# Patient Record
(demographics unavailable — no encounter records)

---

## 2018-04-18 NOTE — PDOC
History of Present Illness





- General


Chief Complaint: Cold Symptoms


Stated Complaint: BODYACHES


Time Seen by Provider: 04/18/18 12:50


History Source: Patient





- History of Present Illness


Timing/Duration: reports: other


Associated Symptoms: reports: cough.  denies: fever/chills, shortness of breath





Past History





- Past Medical History


Allergies/Adverse Reactions: 


 Allergies











Allergy/AdvReac Type Severity Reaction Status Date / Time


 


No Known Allergies Allergy   Verified 04/18/18 12:28











Home Medications: 


Ambulatory Orders





Hydrochlorothiazide [Hctz -] 12.5 mg PO DAILY 01/17/14 


Lisinopril [Prinivil -] 40 mg PO DAILY 01/17/14 








COPD: No


HTN: Yes





- Immunization History


Immunization Up to Date: Yes





- Suicide/Smoking/Psychosocial Hx


Smoking Status: No


Smoking History: Never smoked


Have you smoked in the past 12 months: No


Number of Cigarettes Smoked Daily: 0


Information on smoking cessation initiated: No


Hx Alcohol Use: No


Drug/Substance Use Hx: No


Substance Use Type: None





**Review of Systems





- Review of Systems


Constitutional: No: Chills, Fever


Respiratory: Yes: Cough.  No: Shortness of Breath


Cardiac (ROS): No: Chest Pain





*Physical Exam





- Vital Signs


 Last Vital Signs











Temp Pulse Resp BP Pulse Ox


 


 97.8 F   69   16   141/87   100 


 


 04/18/18 12:25  04/18/18 12:25  04/18/18 12:25  04/18/18 12:25  04/18/18 12:25














- Physical Exam


General Appearance: Yes: Appropriately Dressed.  No: Apparent Distress


HEENT: positive: Normal Voice


Respiratory/Chest: positive: Lungs Clear, Normal Breath Sounds.  negative: 

Respiratory Distress


Cardiovascular: positive: Regular Rate, S1, S2


Integumentary: positive: Dry, Warm


Neurologic: positive: Fully Oriented, Alert, Normal Mood/Affect





Medical Decision Making





- Medical Decision Making





04/18/18 13:24


62 yo M, h/o HTN, here w/ productive cough x 6 days. No sob, f/c. Denies body 

aches to me as was reported to triage. Not taking anything for cough. No tob 

hx. Pt well deni and stable w/ clear chest/lungs. M/l viral. Dc w/ OTC meds and 

pmd f/u as needed





*DC/Admit/Observation/Transfer


Diagnosis at time of Disposition: 


 Cough








- Discharge Dispostion


Disposition: HOME


Condition at time of disposition: Good





- Referrals


Referrals: 


Ana Edwards MD [Primary Care Provider] - 





- Patient Instructions


Additional Instructions: 


Es muy probable que tenga max infeccin viral de las vas respiratorias 

superiores que se resolver a tiempo. Puede carmen Delsym, Robitussin o 

guafenisin en el mostrador. Tambin payal muchos lquidos. Si los sntomas 

persisten, doris un seguimiento con francis PMD





- Post Discharge Activity

## 2018-12-20 NOTE — PDOC
History of Present Illness





- General


Chief Complaint: Cold Symptoms


Stated Complaint: COUGH


Time Seen by Provider: 12/20/18 11:06


History Source: Patient


Exam Limitations: No Limitations





- History of Present Illness


Initial Comments: 





CHIEF COMPLAINT:





HISTORY OF PRESENT ILLNESS:





Vital signs on arrival are within normal limits. 





REVIEW OF SYSTEMS:


GENERAL/CONSTITUTIONAL: Subjective fever/chills. No weakness. No weight change.


HEAD, EYES, EARS, NOSE AND THROAT: No change in vision. No ear pain or 

discharge. No sore throat.


CARDIOVASCULAR: No chest pain or shortness of breath.


RESPIRATORY: No cough, wheezing, or hemoptysis.


GASTROINTESTINAL: See history of present illness.


GENITOURINARY: No dysuria, frequency, or change in urination.


MUSCULOSKELETAL: No joint or muscle swelling or pain. No neck or back pain.


SKIN: No rash or easy bruising.


NEUROLOGIC: No headache, vertigo, loss of consciousness, or loss of sensation.








PHYSICAL EXAM:


GENERAL: The patient is awake, alert, and fully oriented, in no acute distress.


HEAD: Normal with no signs of trauma.


ENT: Pupils equal, round and reactive to light, extraocular movements intact, 

sclera anicteric, conjunctiva clear. Neck supple.


LUNGS: Clear to auscultation bilaterally. Normal excursion. No respiratory 

distress or use of accessory muscles.


CV: RRR, S1/S2, no MRG. Cap refill < 2 sec.


ABDOMEN: Soft, non-distended, non-tender even to deep palpation, no 

hepatomegaly or splenomegaly, no masses.


EXTREMITIES: Normal range of motion, no edema.


NEUROLOGICAL: Normal speech, normal gait. CN II-XII grossly intact.


SKIN: Warm, dry, normal turgor, no rashes or lesions noted.








Past History





- Past Medical History


Allergies/Adverse Reactions: 


 Allergies











Allergy/AdvReac Type Severity Reaction Status Date / Time


 


No Known Allergies Allergy   Verified 04/18/18 12:28











Home Medications: 


Ambulatory Orders





Hydrochlorothiazide [Hctz -] 12.5 mg PO DAILY 01/17/14 


Lisinopril [Prinivil -] 40 mg PO DAILY 01/17/14 


Guaifenesin 200 mg PO Q6H #20 tablet 12/20/18 








COPD: No


HTN: Yes





- Immunization History


Immunization Up to Date: Yes





- Suicide/Smoking/Psychosocial Hx


Smoking Status: No


Smoking History: Never smoked


Have you smoked in the past 12 months: No


Number of Cigarettes Smoked Daily: 0


Hx Alcohol Use: No


Drug/Substance Use Hx: No


Substance Use Type: None





*Physical Exam





- Vital Signs


 Last Vital Signs











Temp Pulse Resp BP Pulse Ox


 


 98.2 F   85   18   141/81   98 


 


 12/20/18 10:16  12/20/18 10:16  12/20/18 10:16  12/20/18 10:16  12/20/18 10:16














Moderate Sedation





- Procedure Monitoring


Vital Signs: 


Procedure Monitoring Vital Signs











Temperature  98.2 F   12/20/18 10:16


 


Pulse Rate  85   12/20/18 10:16


 


Respiratory Rate  18   12/20/18 10:16


 


Blood Pressure  141/81   12/20/18 10:16


 


O2 Sat by Pulse Oximetry (%)  98   12/20/18 10:16











Medical Decision Making





- Medical Decision Making





A/P:














*DC/Admit/Observation/Transfer


Diagnosis at time of Disposition: 


 Cough








- Discharge Dispostion


Disposition: HOME


Condition at time of disposition: Good





- Referrals


Referrals: 


Ana Edwards MD [Primary Care Provider] - 





- Patient Instructions


Printed Discharge Instructions:  DI for Cough -- Adult


Additional Instructions: 


Discharge Instructions:


-A prescription for cough medicine has been sent to your pharmacy


-Please call your doctor in 1 week if no improvement to set up an appointment








Instrucciones de descarga:


-Ade receta para medicamentos contra la tos ha sido enviada a francis farmacia.


- Llame a francis mdico en 1 semana si no hay mejoras para programar ade hawa.





Print Language: Pashto





- Post Discharge Activity


Forms/Work/School Notes:  Back to Work

## 2019-02-25 NOTE — PDOC
History of Present Illness





- General


Chief Complaint: Eye Problem


Stated Complaint: RT EYE PAIN


Time Seen by Provider: 02/25/19 09:32


History Source: Patient


Exam Limitations: No Limitations





- History of Present Illness


Initial Comments: 





02/25/19 


States yesterday was rubbing eyes when it accidentally scratched his right 

cornea. States had immediate pain, irrigated it, use cool compresses but today 

woke up and it was more painful and red. Denies visual changes, no other injury.


Timing/Duration: unsure, 24 hours


Severity: mild, moderate





Past History





- Travel


Traveled outside of the country in the last 30 days: No


Close contact w/someone who was outside of country & ill: No





- Past Medical History


Allergies/Adverse Reactions: 


 Allergies











Allergy/AdvReac Type Severity Reaction Status Date / Time


 


No Known Allergies Allergy   Verified 04/18/18 12:28











Home Medications: 


Ambulatory Orders





Hydrochlorothiazide [Hctz -] 12.5 mg PO DAILY 01/17/14 


Lisinopril [Prinivil -] 40 mg PO DAILY 01/17/14 


Guaifenesin 200 mg PO Q6H #20 tablet 12/20/18 








COPD: No


HTN: Yes





- Immunization History


Immunization Up to Date: Yes





- Suicide/Smoking/Psychosocial Hx


Smoking Status: No


Smoking History: Never smoked


Have you smoked in the past 12 months: No


Number of Cigarettes Smoked Daily: 0


Information on smoking cessation initiated: No


Hx Alcohol Use: No


Drug/Substance Use Hx: No


Substance Use Type: None





**Review of Systems





- Review of Systems


Able to Perform ROS?: Yes


Is the patient limited English proficient: Yes


Constitutional: Yes: Symptoms Reported, See HPI, Malaise.  No: Fever


HEENTM: Yes: Symptoms Reported, See HPI, Eye Pain, Tearing, Other


Musculoskeletal: No: Symptoms Reported


Integumentary: No: Symptoms Reported


All Other Systems: Reviewed and Negative





*Physical Exam





- Vital Signs


 Last Vital Signs











Temp Pulse Resp BP Pulse Ox


 


 97.8 F   76   16   149/83   100 


 


 02/25/19 09:12  02/25/19 09:12  02/25/19 09:12  02/25/19 09:12  02/25/19 09:12














- Physical Exam


General Appearance: Yes: Nourished, Appropriately Dressed, Apparent Distress, 

Mild Distress


HEENT: positive: ANMOL (visual acuity 20/30 and affected eye, 20/50 in 

unaffected eye), TMs Normal, Rhinorrhea, Other (right eye is erythematous with 

tearing, photophobic. Globe is intact, no hyphema fluorescein staining reveals 

a 2 mm superficial abrasion at 9:00 looking at pupil)


Neck: positive: Supple.  negative: Tender


Respiratory/Chest: positive: Lungs Clear, Normal Breath Sounds


Integumentary: positive: Normal Color, Dry, Warm


Neurologic: positive: CNs II-XII NML intact, Fully Oriented, Alert, Normal Mood/

Affect, Normal Response, Motor Strength 5/5





Moderate Sedation





- Procedure Monitoring


Vital Signs: 


Procedure Monitoring Vital Signs











Temperature  97.8 F   02/25/19 09:12


 


Pulse Rate  76   02/25/19 09:12


 


Respiratory Rate  16   02/25/19 09:12


 


Blood Pressure  149/83   02/25/19 09:12


 


O2 Sat by Pulse Oximetry (%)  100   02/25/19 09:12











Medical Decision Making





- Medical Decision Making





02/25/19 corneal abrasion, discussed case with Dr. Bishop's office who will be 

able to see patient at noon today for thorough ophthalmology exam. Erythromycin 

ointment applied and will discharged to be seen by ophthalmology today





*DC/Admit/Observation/Transfer


Diagnosis at time of Disposition: 


Corneal abrasion


Qualifiers:


 Encounter type: initial encounter Laterality: right Qualified Code(s): 

S05.01XA - Injury of conjunctiva and corneal abrasion without foreign body, 

right eye, initial encounter








- Discharge Dispostion


Disposition: HOME


Condition at time of disposition: Stable


Decision to Admit order: No





- Referrals


Referrals: 


Ana Edwards MD [Primary Care Provider] - 


Taty Bishop MD [Staff Physician] - 





- Patient Instructions


Printed Discharge Instructions:  DI for Corneal Abrasion


Additional Instructions: 


Rest, avoid rubbing eyes


Wash hands, use eye drops as directed, wash hands after use 


May use eye lubricating drops as often as needed


erythromycin ointment, one thin film 3 times a day for 5 days


Tylenol or ibuprofen for pain relief


Avoid contact with others until redness and discharge is gone from eyes.


Followup with ophthalmology in one to 2 days for thorough exam


Return to emergency department for worsened pain, swelling, vision problems.





Go TO 'S OFFICE AT 12:00





- Post Discharge Activity


Forms/Work/School Notes:  Back to Work

## 2019-04-08 NOTE — PDOC
History of Present Illness





- General


Chief Complaint: Pain, Acute


Stated Complaint: ABDOMINAL PAIN


Time Seen by Provider: 04/08/19 10:04


History Source: Patient


Exam Limitations: Other (poor historian/insight, even with )





- History of Present Illness


Initial Comments: 


Pt is a 65 yo M, with PMH of nephrolithiasis and HTN (well-controlled), who is 

presenting with complaints of suprapubic pain x4 days. Pt also endorses urinary 

frequency and a "pulling sensation" and worsening lower abdominal pain when he 

urinates. Pt recently went to the [place] 3/1-3/22, and was having 

normal BMs and was drinking bottled water. Pt states he has a history of kidney 

stones, but this pain feels differently and he has not had a stone for "many 

years". Pt has not taken any OTC medication for pain. Pt is tolerating PO food 

and fluid intake without difficulty. Pt has been having regular BMs almost 

every day, but has been having loose stool 2/2 to taking laxatives because "he 

thought maybe he had eaten something bad which is why he had pain". Pt denies 

any recent fevers/chills, headache, vision changes, syncope, chest pain, 

palpitations, SOB, nausea/vomiting, hematuria, testicular pain, penile discharge

, constipation, or leg swelling.





Social: Pt denies any cigarette, alcohol, or drug use. Prior smoker but quit 20 

years ago.


Recent trip to  (see above), but no known sick contacts.


Surgical: no relevant history.


Family: no relevant history.


04/08/19 12:35








Past History





- Travel


Traveled outside of the country in the last 30 days: No


Close contact w/someone who was outside of country & ill: No





- Past Medical History


Allergies/Adverse Reactions: 


 Allergies











Allergy/AdvReac Type Severity Reaction Status Date / Time


 


No Known Allergies Allergy   Verified 04/08/19 08:59











Home Medications: 


Ambulatory Orders





Hydrochlorothiazide [Hctz -] 12.5 mg PO DAILY 01/17/14 


Lisinopril [Prinivil -] 40 mg PO DAILY 01/17/14 


Metronidazole 500 mg PO TID #21 tablet 04/08/19 


Sulfamethoxazole/Trimethoprim [Bactrim Ds -] 1 tab PO BID #14 tablet 04/08/19 








COPD: No


HTN: Yes





- Immunization History


Immunization Up to Date: Yes





- Suicide/Smoking/Psychosocial Hx


Smoking Status: No


Smoking History: Never smoked


Have you smoked in the past 12 months: No


Number of Cigarettes Smoked Daily: 0


Hx Alcohol Use: No


Drug/Substance Use Hx: No


Substance Use Type: None





**Review of Systems





- Review of Systems


Able to Perform ROS?: Yes


Is the patient limited English proficient: No


Constitutional: Yes: Weight Stable.  No: Chills, Diaphoresis, Fever, Loss of 

Appetite, Malaise, Weakness


HEENTM: No: Blurred Vision, Recent change in vision, Nose Congestion, Throat 

Pain, Throat Swelling, Difficulty Swallowing


Respiratory: No: Cough, Orthopnea, Shortness of Breath


Cardiac (ROS): No: Chest Pain, Edema, Irregular Heart Rate, Lightheadedness, 

Palpitations, Syncope, Chest Tightness


ABD/GI: Yes: See HPI, Diarrhea (loose stools 2/2 laxatives, see HPI).  No: 

Abdominal Distended, Blood Streaked Bowels, Constipated, Nausea, Poor Appetite, 

Poor Fluid Intake, Vomiting, Indigestion, Abdominal cramping


: Yes: Frequency, Pain, Urgency.  No: Burning, Dysuria, Discharge, Flank Pain

, Hematuria, Incontinence, Testicular Mass, Testicular Swelling, Lesions, 

Testicular Pain


Musculoskeletal: No: Back Pain, Joint Pain, Muscle Weakness


Integumentary: No: Rash


Neurological: No: Headache, Numbness, Paresthesia, Weakness, Unsteady Gait, 

Dizziness


Psychiatric: No: Sleep Pattern Change, Change in Appetite


Endocrine: No: Increased Urine, Change in Weight


Hematologic/Lymphatic: No: Anemia, Blood Clots, Easy Bleeding, Easy Bruising


All Other Systems: Reviewed and Negative





*Physical Exam





- Vital Signs


 Last Vital Signs











Temp Pulse Resp BP Pulse Ox


 


 98.7 F   82   16   145/77   98 


 


 04/08/19 09:00  04/08/19 09:00  04/08/19 09:00  04/08/19 09:00  04/08/19 09:00














- Physical Exam


Comments: 


Vitals stable, pt afebrile. 


Pt in NAD, normal body habitus. PE showed pt alert and oriented. 


CNs generally intact, muscular strength and sensation intact. 


Head normocephalic, atraumatic.


Eyes PERRLA, EOMI. Oropharynx without erythema or exudates, no LAD b/l. 


No nasal congestion, hearing intact. 


Clear heart sounds, S1/S2, no JVD, b/l pedal edema, or heart murmur. 


Clear lung sounds, no respiratory distress, wheezes, crackles, or accessory 

muscle use. 


Diffuse suprapubic and lower abdominal tenderness to palpation, no CVA 

tenderness to palpation, no rebound, no guarding. Abdomen soft, non-distended, 

and with normoactive bowel sounds. 


: No testicular tenderness or swelling, no penile discharge or lesions.


Skin without jaundice or rash. 


04/08/19 11:30











ED Treatment Course





- LABORATORY


CBC & Chemistry Diagram: 


 04/08/19 11:09





 04/08/19 11:09





Medical Decision Making





- Medical Decision Making


Pt was seen at bedside, also will be seen by attending Dr. Mart. Pt presenting 

with complaints of suprapubic pain x4 days. Pt also endorses urinary frequency 

and a "pulling sensation" and worsening lower abdominal pain when he urinates. 

Pt recently went to the [place] 3/1-3/22, and was having normal BMs 

and was drinking bottled water. Pt states he has a history of kidney stones, 

but this pain feels differently and he has not had a stone for "many years". Pt 

has not taken any OTC medication for pain. Pt is tolerating PO food and fluid 

intake without difficulty. Pt has been having regular BMs almost every day, but 

has been having loose stool 2/2 to taking laxatives because "he thought maybe 

he had eaten something bad which is why he had pain". Pt denies any recent 

fevers/chills, headache, vision changes, syncope, chest pain, palpitations, SOB

, nausea/vomiting, hematuria, testicular pain, penile discharge, constipation, 

or leg swelling.





Considering UTI vs nephrolithiasis vs pyelo/infected stone vs LUTS/BPH. No 

testicular pain or penile discharge to suggest torsion or STI. 


Ordered work-up including CBC, CMP, UA, urine culture, US of kidneys/bladder.


Provided 650 mg PO tylenol for pain.


Will continue to reassess pt and monitor for symptomatic improvement.


04/08/19 11:17





Pt taken for US scan of kidneys and bladder.


04/08/19 12:27





CBC, CMP, and UA WNL. 


04/08/19 12:40





US with small post-void residual, normal prostate, normal kidneys with no 

evidence of hydro or stones.


Ordered CT abd/pelvis with IV contrast to r/o other abdominal pathology.


04/08/19 13:53





CT abd/pelvis showed acute sigmoid diverticulitis.


Sent 500 mg flagyl TID and 800 mg bactrim DS BID to pt pharmacy.


Pt has follow-up with PCP (Dr. Davidson). Strict return precautions provided 

with pt understanding.


04/08/19 14:56








*DC/Admit/Observation/Transfer


Diagnosis at time of Disposition: 


 Sigmoid diverticulitis








- Discharge Dispostion


Disposition: HOME


Condition at time of disposition: Improved


Decision to Admit order: No





- Referrals


Referrals: 


Ana Edwards MD [Primary Care Provider] - 





- Patient Instructions


Printed Discharge Instructions:  DI for Diverticulitis


Additional Instructions: 


You were seen in the ER today for lower abdominal pain. The results of your 

labs and imaging today showed diverticulitis, or small inflamed outpouchings in 

your intestines. We have sent 2 antibiotics to your pharmacy. Please follow-up 

with your primary care doctor within 1-2 days to discuss your visit and make 

sure your symptoms have improved. Please return to the ER if you have any 

worsening pain, development of fevers or chills, loss of consciousness, 

inability to tolerate food or fluids, or any other concerns.





Print Language: Kazakh





- Post Discharge Activity

## 2019-04-08 NOTE — PDOC
Attending Attestation





- Resident


Resident Name: Jewels Singh





- ED Attending Attestation


I have performed the following: I have examined & evaluated the patient, The 

case was reviewed & discussed with the resident, I agree w/resident's findings 

& plan, Exceptions are as noted





- HPI


HPI: 





04/08/19 11:18


64 M with h/o HTN presenting to ED with lower abdominal pain. Pt states it 

began 4 days ago and is localized to his suprapubic region. Pt denies N/V/D. 

Denies constipation but he states he has been taking laxatives, with no relief 

of his pain. Denies any F/C. Denies dysuria or flank pain.





- Physicial Exam


PE: 





04/08/19 11:19


"GENERAL: Awake, alert, and fully oriented, in no acute distress.


HEAD: No signs of trauma


EYES: PERRLA, EOMI, sclera anicteric, conjunctiva clear


ENT: Auricles normal inspection, hearing grossly normal, nares patent, 

oropharynx clear without exudates. Moist mucosa


NECK: Nontender, no stepoffs, Normal ROM, supple, no lymphadenopathy, JVD, or 

masses


LUNGS: Breath sounds equal, clear to auscultation bilaterally.  No wheezes, and 

no crackles


HEART: Regular rate and rhythm, normal S1 and S2, no murmurs, rubs or gallops


ABDOMEN: + diffuse lower abdominal TTP, normoactive bowel sounds.  No guarding, 

no rebound.  No masses


EXTREMITIES: Normal range of motion, no edema.  No clubbing or cyanosis. No 

cords, erythema, or tenderness


NEUROLOGICAL: Cranial nerves II through XII intact. 5/5 strength and sensation 

in all extremities, Normal speech, normal gait, normal cerebellar function


SKIN: Warm, Dry, normal turgor, no rashes or lesions noted.





- Medical Decision Making





04/08/19 11:21


64 M with lower abdominal pain. Possible urinary retention. Will obtain US to r/

o bladder distention or hydronephrosis. Also consider colitis/diverticulitis/

appendicitis.


- Labs, UA


- Renal/bladder US


- Consider CT





04/08/19 14:54


Labs wnl


US normal


CT shows acute sigmoid diverticulitis


Will DC with bactrim + flagyl


Pt reassessed - pain is well controlled. Pt tolerating PO


Pt is well appearing, with normal vitals. Clinically stable for DC at this time.


I discussed the physical exam findings, ancillary test results and final 

diagnoses with the patient. I answered all of the patient's questions. The 

patient was satisfied with the care received and felt comfortable with the 

discharge plan and treatment plan.  The patient agrees to follow up with the 

primary care physician within 24-72 hours.

## 2020-01-13 NOTE — PDOC
Documentation entered by Lianna Roberts SCRIBE, acting as scribe for Graeme Mock MD.








Graeme Mock MD:  This documentation has been prepared by the Armando stoll Xhesika, SCRIBE, under my direction and personally reviewed by me in its 

entirety.  I confirm that the documentation accurately reflects all work, 

treatment, procedures, and medical decision making performed by me.  





History of Present Illness





- General


Chief Complaint: Pain, Acute


Stated Complaint: FEVER/CHILLS/ABD PAIN


Time Seen by Provider: 01/13/20 09:50


History Source: Patient


Exam Limitations: No Limitations





- History of Present Illness


Initial Comments: 





01/13/20 10:15


The patient is a 65 year old male with a significant PMH of HTN and 

diverticulitis (treated outpatient, never seen by GI) who presents to the 

emergency department for chills/sweats and abdominal pain since last night. The 

patient states yesterday morning he was at his usual normal health, during the 

night he felt sudden onset chills/sweats and abdominal pain. Pt states he took 

Tylenol and Aleve with mild relief of symptoms. Pt states this morning he was 

having a cup of coffee and endorsed similar symptoms, promoting his arrival to 

the ED. Pt denies any recent travel or sick contacts. Pt denies being on any 

new medication/abx.





The patient denies chest pain, shortness of breath, headache and dizziness. 

Denies fever, cough, nausea, vomiting, diarrhea and constipation. Denies dysuria

, frequency, urgency and hematuria.





Allergies: Sulfa


PCP: Rosa Hernández 











Past History





- Past Medical History


Allergies/Adverse Reactions: 


 Allergies











Allergy/AdvReac Type Severity Reaction Status Date / Time


 


Sulfa (Sulfonamide Allergy   Verified 01/13/20 08:09





Antibiotics)     











Home Medications: 


Ambulatory Orders





Hydrochlorothiazide [Hctz -] 12.5 mg PO DAILY 01/17/14 


Lisinopril [Prinivil -] 40 mg PO DAILY 01/17/14 








COPD: No


HTN: Yes





- Immunization History


Immunization Up to Date: Yes





- Psycho Social/Smoking Cessation Hx


Smoking Status: No


Smoking History: Never smoked


Have you smoked in the past 12 months: No


Number of Cigarettes Smoked Daily: 0


Hx Alcohol Use: No


Drug/Substance Use Hx: No


Substance Use Type: None





**Review of Systems





- Review of Systems


Able to Perform ROS?: Yes


Constitutional: Yes: Chills.  No: Fever


HEENTM: No: Ear Pain, Nose Congestion, Throat Pain


Respiratory: Yes: Cough (dry x1 week).  No: Shortness of Breath


Cardiac (ROS): No: Chest Pain, Edema, Lightheadedness


ABD/GI: No: Constipated, Diarrhea, Nausea, Vomiting


: No: Burning, Dysuria, Flank Pain


Integumentary: No: Rash


Neurological: No: Headache


All Other Systems: Reviewed and Negative





*Physical Exam





- Vital Signs


 Last Vital Signs











Temp Pulse Resp BP Pulse Ox


 


 97.9 F   82   16   123/67   96 


 


 01/13/20 08:06  01/13/20 08:06  01/13/20 08:06  01/13/20 08:06  01/13/20 08:06














- Physical Exam





01/13/20 10:17


GENERAL: The patient is awake, alert, and fully oriented, in no acute distress.


HEAD: Normal with no signs of trauma.


LUNGS: Breath sounds equal, clear to auscultation bilaterally.  No wheeze/

crackles.


HEART: Regular rate and rhythm, normal S1 and S2 without murmur or rub.


ABDOMEN: +bilateral mid and lower abdominal tenderness L>R. Soft/nondistended. 

BS wnl.  No guarding or rebound.  No palpable masses. No hepatosplenomegaly.


EXTREMITIES: Normal range of motion, no edema.  No clubbing or cyanosis. No 

cords, erythema, or tenderness.


NEUROLOGICAL: Cranial nerves II through XII grossly intact.  Normal speech, 

normal gait.


PSYCH: Normal mood, normal affect.


SKIN: Warm, Dry, normal turgor, no rashes or lesions noted.








**Heart Score/ECG Review


  ** #1


ECG reviewed & interpreted by me at: 11:13


General ECG Interpretation: Sinus Rhythm, Normal Rate (72), Normal Intervals (

), No acute ischemic changes





ED Treatment Course





- LABORATORY


CBC & Chemistry Diagram: 


 01/13/20 09:51





 01/13/20 09:51





- RADIOLOGY


Radiology Studies Ordered: 














 Category Date Time Status


 


 ABDOMEN & PELVIS CT WITH CONTR [CT] Stat CT Scan  01/13/20 10:06 Ordered














Medical Decision Making





- Medical Decision Making





01/13/20 10:11


A portion of this note was documented by scribe services under my direction. I 

have reviewed the details of the note, within reason, and agree with the 

documentation with the following case summary and management plan written by me.





65-year-old male with history of hypertension and diverticulitis, otherwise 

healthy and well and high functioning, presents now with chills since last 

night and mild abdominal cramping.  Patient reports episode of subjective 

chills with periumbilical/epigastric discomfort last night, resolved but then 

recurred again this morning after drinking coffee.  No associated nausea/

vomiting/diarrhea/constipation, slight dry cough for about 1 week but no 

shortness of breath or other URI symptoms, no cardiopulmonary complaints.


No recent travel/sick contacts/antibiotics, never followed up with GI after his 

last episode of diverticulitis in April 2019.





Vitals within normal limits


Well-appearing


Exam as noted with localized left lower quadrant tenderness on exam, otherwise 

soft and nondistended.





65-year-old male presents with nonspecific chills and intermittent abdominal 

cramping since last night, well-appearing here with normal vital signs and an 

exam that only localizes to the lower abdomen, left worse than right.  

Presentation could be consistent with enteritis/gastroenteritis, rule out 

recurrence of diverticulitis/colitis, other nonspecific viral etiology of 

chills is also on the differential.


Labs


EKG, check troponin given age


IV fluids, antacid.  Declines pain medication at this time.


CT of the abdomen and pelvis given  focal peritoneal findings


Reassess





01/13/20 11:37


Labs are within normal limits, CBC without leukocytosis, chemistries are also 

normal including lipase and troponin.


CT of the abdomen and pelvis pending, disposition accordingly.





01/13/20 13:25


CT without acute pathology, shows diverticulosis without diverticulitis.  

Abdominal exam benign with no guarding or rebound in the left lower quadrant, 

tolerating liquids and soup, agrees with discharge plan.  GI referral, 

understands return criteria.





Discharge





- Discharge Information


Problems reviewed: Yes


Clinical Impression/Diagnosis: 


Abdominal pain


Qualifiers:


 Abdominal location: unspecified location Qualified Code(s): R10.9 - 

Unspecified abdominal pain





Condition: Improved


Disposition: HOME





- Follow up/Referral


Referrals: 


Rosa Lou MD [Primary Care Provider] - 


Raulito Rodriguez DO [Staff Physician] - 


Rosa Martin DO [Staff Physician] - 





- Patient Discharge Instructions


Patient Printed Discharge Instructions:  DI for Diarrhea and Traveler's 

Diarrhea -- Adult


Additional Instructions: 


Activity as tolerated. Stay hydrated. 





Blood test and a CAT scan of the abdomen and pelvis showed no acute 

abnormalities.  Your symptoms are likely due to a viral infection, which should 

run its course over the next 2 days.





Tylenol 1000 mg every 8 hours as needed for pain.  Take Pepcid 20 mg twice 

daily for the next week, avoiding dairy, spicy or fatty food, caffeine and 

alcohol.





Continue your medications as previously prescribed by your physician.  





You should follow up with your primary doctor and a gastroenterologist (

consider calling Dr. Martin or Dr. Carey for an appointment) as soon as 

possible regarding today's emergency department visit.





Return to the emergency department for any new or concerning symptoms, 

particularly persistent or worsening pain, fevers or chills, bloody diarrhea.





- Post Discharge Activity

## 2020-01-13 NOTE — EKG
Test Reason : 

Blood Pressure : ***/*** mmHG

Vent. Rate : 072 BPM     Atrial Rate : 072 BPM

   P-R Int : 152 ms          QRS Dur : 108 ms

    QT Int : 394 ms       P-R-T Axes : 055 039 055 degrees

   QTc Int : 431 ms

 

NORMAL SINUS RHYTHM

NORMAL ECG

WHEN COMPARED WITH ECG OF 17-JAN-2014 16:28,

NO SIGNIFICANT CHANGE WAS FOUND

Confirmed by ANGELA STOVER MD ([id number]) on 1/13/2020 3:18:59 PM

 

Referred By:             Confirmed By:ANGELA STOVER MD